# Patient Record
Sex: FEMALE | Race: BLACK OR AFRICAN AMERICAN | Employment: FULL TIME | URBAN - METROPOLITAN AREA
[De-identification: names, ages, dates, MRNs, and addresses within clinical notes are randomized per-mention and may not be internally consistent; named-entity substitution may affect disease eponyms.]

---

## 2020-02-10 ENCOUNTER — HOSPITAL ENCOUNTER (EMERGENCY)
Facility: HOSPITAL | Age: 25
Discharge: HOME | End: 2020-02-11
Attending: EMERGENCY MEDICINE
Payer: COMMERCIAL

## 2020-02-10 DIAGNOSIS — R11.2 NAUSEA VOMITING AND DIARRHEA: ICD-10-CM

## 2020-02-10 DIAGNOSIS — B96.89 BV (BACTERIAL VAGINOSIS): ICD-10-CM

## 2020-02-10 DIAGNOSIS — R19.7 NAUSEA VOMITING AND DIARRHEA: ICD-10-CM

## 2020-02-10 DIAGNOSIS — N30.90 BLADDER INFECTION: Primary | ICD-10-CM

## 2020-02-10 DIAGNOSIS — N76.0 BV (BACTERIAL VAGINOSIS): ICD-10-CM

## 2020-02-10 LAB
ALBUMIN SERPL-MCNC: 3.7 G/DL (ref 3.4–5)
ALBUMIN/GLOB SERPL: 0.9 {RATIO} (ref 1–2)
ALP LIVER SERPL-CCNC: 66 U/L (ref 52–144)
ALT SERPL-CCNC: 15 U/L (ref 13–56)
ANION GAP SERPL CALC-SCNC: 4 MMOL/L (ref 0–18)
AST SERPL-CCNC: 17 U/L (ref 15–37)
BASOPHILS # BLD AUTO: 0.01 X10(3) UL (ref 0–0.2)
BASOPHILS NFR BLD AUTO: 0.1 %
BILIRUB SERPL-MCNC: 0.7 MG/DL (ref 0.1–2)
BILIRUB UR QL STRIP.AUTO: NEGATIVE
BUN BLD-MCNC: 6 MG/DL (ref 7–18)
BUN/CREAT SERPL: 8.1 (ref 10–20)
CALCIUM BLD-MCNC: 8.6 MG/DL (ref 8.5–10.1)
CHLORIDE SERPL-SCNC: 107 MMOL/L (ref 98–112)
CLARITY UR REFRACT.AUTO: CLEAR
CO2 SERPL-SCNC: 26 MMOL/L (ref 21–32)
COLOR UR AUTO: YELLOW
CREAT BLD-MCNC: 0.74 MG/DL (ref 0.55–1.02)
DEPRECATED RDW RBC AUTO: 39.8 FL (ref 35.1–46.3)
EOSINOPHIL # BLD AUTO: 0.06 X10(3) UL (ref 0–0.7)
EOSINOPHIL NFR BLD AUTO: 0.5 %
ERYTHROCYTE [DISTWIDTH] IN BLOOD BY AUTOMATED COUNT: 12.2 % (ref 11–15)
GLOBULIN PLAS-MCNC: 4.1 G/DL (ref 2.8–4.4)
GLUCOSE BLD-MCNC: 87 MG/DL (ref 70–99)
GLUCOSE UR STRIP.AUTO-MCNC: NEGATIVE MG/DL
HCT VFR BLD AUTO: 43.4 % (ref 35–48)
HGB BLD-MCNC: 14.5 G/DL (ref 12–16)
IMM GRANULOCYTES # BLD AUTO: 0.03 X10(3) UL (ref 0–1)
IMM GRANULOCYTES NFR BLD: 0.2 %
KETONES UR STRIP.AUTO-MCNC: NEGATIVE MG/DL
LIPASE SERPL-CCNC: 36 U/L (ref 73–393)
LYMPHOCYTES # BLD AUTO: 1.05 X10(3) UL (ref 1–4)
LYMPHOCYTES NFR BLD AUTO: 8.6 %
M PROTEIN MFR SERPL ELPH: 7.8 G/DL (ref 6.4–8.2)
MCH RBC QN AUTO: 29.7 PG (ref 26–34)
MCHC RBC AUTO-ENTMCNC: 33.4 G/DL (ref 31–37)
MCV RBC AUTO: 88.8 FL (ref 80–100)
MONOCYTES # BLD AUTO: 0.59 X10(3) UL (ref 0.1–1)
MONOCYTES NFR BLD AUTO: 4.8 %
NEUTROPHILS # BLD AUTO: 10.52 X10 (3) UL (ref 1.5–7.7)
NEUTROPHILS # BLD AUTO: 10.52 X10(3) UL (ref 1.5–7.7)
NEUTROPHILS NFR BLD AUTO: 85.8 %
NITRITE UR QL STRIP.AUTO: POSITIVE
OSMOLALITY SERPL CALC.SUM OF ELEC: 281 MOSM/KG (ref 275–295)
PH UR STRIP.AUTO: 6 [PH] (ref 4.5–8)
PLATELET # BLD AUTO: 290 10(3)UL (ref 150–450)
POTASSIUM SERPL-SCNC: 3.7 MMOL/L (ref 3.5–5.1)
PROT UR STRIP.AUTO-MCNC: NEGATIVE MG/DL
RBC # BLD AUTO: 4.89 X10(6)UL (ref 3.8–5.3)
SODIUM SERPL-SCNC: 137 MMOL/L (ref 136–145)
SP GR UR STRIP.AUTO: 1.02 (ref 1–1.03)
UROBILINOGEN UR STRIP.AUTO-MCNC: <2 MG/DL
WBC # BLD AUTO: 12.3 X10(3) UL (ref 4–11)

## 2020-02-10 PROCEDURE — 81025 URINE PREGNANCY TEST: CPT

## 2020-02-10 PROCEDURE — 96374 THER/PROPH/DIAG INJ IV PUSH: CPT

## 2020-02-10 PROCEDURE — 83690 ASSAY OF LIPASE: CPT | Performed by: EMERGENCY MEDICINE

## 2020-02-10 PROCEDURE — 99284 EMERGENCY DEPT VISIT MOD MDM: CPT

## 2020-02-10 PROCEDURE — 81001 URINALYSIS AUTO W/SCOPE: CPT | Performed by: EMERGENCY MEDICINE

## 2020-02-10 PROCEDURE — 85025 COMPLETE CBC W/AUTO DIFF WBC: CPT | Performed by: EMERGENCY MEDICINE

## 2020-02-10 PROCEDURE — 96361 HYDRATE IV INFUSION ADD-ON: CPT

## 2020-02-10 PROCEDURE — 99285 EMERGENCY DEPT VISIT HI MDM: CPT

## 2020-02-10 PROCEDURE — 80053 COMPREHEN METABOLIC PANEL: CPT | Performed by: EMERGENCY MEDICINE

## 2020-02-10 RX ORDER — ONDANSETRON 2 MG/ML
4 INJECTION INTRAMUSCULAR; INTRAVENOUS ONCE
Status: COMPLETED | OUTPATIENT
Start: 2020-02-10 | End: 2020-02-10

## 2020-02-10 RX ORDER — HEPARIN SODIUM AND DEXTROSE 10000; 5 [USP'U]/100ML; G/100ML
INJECTION INTRAVENOUS CONTINUOUS
Status: CANCELLED | OUTPATIENT
Start: 2020-02-11

## 2020-02-10 RX ORDER — HEPARIN SODIUM AND DEXTROSE 10000; 5 [USP'U]/100ML; G/100ML
12 INJECTION INTRAVENOUS ONCE
Status: DISCONTINUED | OUTPATIENT
Start: 2020-02-10 | End: 2020-02-10

## 2020-02-10 RX ORDER — HEPARIN SODIUM 5000 [USP'U]/ML
60 INJECTION INTRAVENOUS; SUBCUTANEOUS ONCE
Status: DISCONTINUED | OUTPATIENT
Start: 2020-02-10 | End: 2020-02-10

## 2020-02-11 VITALS
RESPIRATION RATE: 17 BRPM | SYSTOLIC BLOOD PRESSURE: 108 MMHG | TEMPERATURE: 99 F | OXYGEN SATURATION: 100 % | WEIGHT: 160 LBS | HEART RATE: 68 BPM | DIASTOLIC BLOOD PRESSURE: 62 MMHG | BODY MASS INDEX: 33.58 KG/M2 | HEIGHT: 58 IN

## 2020-02-11 LAB — POCT URINE PREGNANCY: NEGATIVE

## 2020-02-11 PROCEDURE — 87491 CHLMYD TRACH DNA AMP PROBE: CPT | Performed by: EMERGENCY MEDICINE

## 2020-02-11 PROCEDURE — 87660 TRICHOMONAS VAGIN DIR PROBE: CPT | Performed by: EMERGENCY MEDICINE

## 2020-02-11 PROCEDURE — 87591 N.GONORRHOEAE DNA AMP PROB: CPT | Performed by: EMERGENCY MEDICINE

## 2020-02-11 PROCEDURE — 87480 CANDIDA DNA DIR PROBE: CPT | Performed by: EMERGENCY MEDICINE

## 2020-02-11 PROCEDURE — 87510 GARDNER VAG DNA DIR PROBE: CPT | Performed by: EMERGENCY MEDICINE

## 2020-02-11 RX ORDER — ONDANSETRON 4 MG/1
4 TABLET, ORALLY DISINTEGRATING ORAL EVERY 4 HOURS PRN
Qty: 10 TABLET | Refills: 0 | Status: SHIPPED | OUTPATIENT
Start: 2020-02-11 | End: 2020-02-18

## 2020-02-11 RX ORDER — METRONIDAZOLE 500 MG/1
500 TABLET ORAL 2 TIMES DAILY
Qty: 14 TABLET | Refills: 0 | Status: SHIPPED | OUTPATIENT
Start: 2020-02-11 | End: 2020-02-18

## 2020-02-11 RX ORDER — CIPROFLOXACIN 500 MG/1
500 TABLET, FILM COATED ORAL 2 TIMES DAILY
Qty: 6 TABLET | Refills: 0 | Status: SHIPPED | OUTPATIENT
Start: 2020-02-11 | End: 2020-02-14

## 2020-02-11 RX ORDER — ONDANSETRON 4 MG/1
4 TABLET, ORALLY DISINTEGRATING ORAL EVERY 4 HOURS PRN
Qty: 10 TABLET | Refills: 0 | Status: SHIPPED | OUTPATIENT
Start: 2020-02-11 | End: 2020-02-11 | Stop reason: CLARIF

## 2020-02-11 NOTE — ED PROVIDER NOTES
Patient Seen in: [facility] Emergency Department      History   Patient presents with:  Nausea/Vomiting/Diarrhea    Stated Complaint: vomiting since 11am    HPI    82-year-old female presents with nausea, vomiting, diarrhea.   Symptoms began 11 hours Cultures taken. No cervical motion tenderness. No adnexal tenderness on bimanual examination.   Extremities: no edema, normal peripheral pulses   Neuro: Alert oriented and nonfocal   Skin: no rashes or nodules    ED Course     Labs Reviewed   COMP METABOL consistent with gastroenteritis. Patient is well-appearing. Urinalysis is consistent with UTI. Prescribed Cipro. She has had no vomiting or diarrhea here in the emergency room. Pelvic cultures sent for evaluation. Await results.   Labs are otherwise

## 2020-02-12 LAB
C TRACH DNA SPEC QL NAA+PROBE: POSITIVE
N GONORRHOEA DNA SPEC QL NAA+PROBE: NEGATIVE

## 2020-02-13 RX ORDER — AZITHROMYCIN 500 MG/1
1000 TABLET, FILM COATED ORAL DAILY
Qty: 2 TABLET | Refills: 0 | Status: SHIPPED | OUTPATIENT
Start: 2020-02-13

## 2023-10-23 ENCOUNTER — NURSE ONLY (OUTPATIENT)
Dept: INTERNAL MEDICINE CLINIC | Facility: HOSPITAL | Age: 28
End: 2023-10-23
Attending: EMERGENCY MEDICINE

## 2023-10-23 DIAGNOSIS — Z00.00 WELLNESS EXAMINATION: Primary | ICD-10-CM

## 2023-10-23 PROCEDURE — 86480 TB TEST CELL IMMUN MEASURE: CPT

## 2023-10-24 LAB
M TB IFN-G CD4+ T-CELLS BLD-ACNC: 0.03 IU/ML
M TB TUBERC IFN-G BLD QL: NEGATIVE
M TB TUBERC IGNF/MITOGEN IGNF CONTROL: >10 IU/ML
QFT TB1 AG MINUS NIL: 0.04 IU/ML
QFT TB2 AG MINUS NIL: 0.04 IU/ML

## 2023-11-27 ENCOUNTER — TELEPHONE (OUTPATIENT)
Dept: INTERNAL MEDICINE CLINIC | Facility: HOSPITAL | Age: 28
End: 2023-11-27

## 2023-11-27 DIAGNOSIS — Z20.822 SUSPECTED COVID-19 VIRUS INFECTION: Primary | ICD-10-CM

## 2023-11-27 NOTE — TELEPHONE ENCOUNTER
[x] [facility]  []MIRNA   [] [address] [facility]  Manager : Eliz Chinchilla    HAVE YOU RECEIVED THE COVID-19 Vaccine? Yes []    No [x]          If yes, date(s) received: Which vaccine:  Pfizer []     Pedro Renae []    J&J []      SYMPTOMS (reported via dashboard):  [] asymptomatic  [x] symptomatic  [] GI symptoms only    Symptom onset date: 11/27  Fever   > 100F             Yes [x]      Cough                          Yes []      Shortness of breath  Yes []      Congestion                 Yes []      Runny nose                Yes []        Loss of Smell              Yes []        Loss of Taste             Yes []       Sore throat                 Yes []       Fatigue                        Yes []       Body Aches                Yes []        Chills                           Yes []        Headache                   Yes []             GI symptoms             Yes []     No [x]                     Nausea   []          Vomiting            []                                    Diarrhea  []          Upset stomach []      Employee has positive COVID Exposure? Yes []     No [x]    Date of exposure:   []  Coworker                       [] patient                        [] Family/friend    Employee has a history of Covid?   Yes []     No [x]   If Yes, when:    When was the last shift you worked?: 11/24      PLAN:     [id number] testing ordered: [x] Rapid    [] Alinity              Date test is to be taken:   11/27    []  Outside testing                           Notes:    INSTRUCTIONS PROVIDED:    [x]  Employee was instructed to call Central scheduling at [phone] or use [facility] to make an appointment for their testing   [x]  May return to work if employee views negative result in [address] and remains fever, vomiting, and diarrhea free  []  May continue to work if remains asymptomatic and views negative result in [address]  []  Follow up for condition update when resulting  []  If symptoms develop, stay home and call hotline for rapid test order  [] If COVID positive results, off work minimum of 5 days from positive test or onset of symptoms (day 0)     [x]  Plan noted above  [x]  Length of time to obtain results  []  Quarantine instructions  []  S/S of worsening infection/condition and importance of prompt medical re-evaluation including when to seek emergency care.    [] The employee voiced understanding

## 2023-11-28 ENCOUNTER — LAB ENCOUNTER (OUTPATIENT)
Dept: LAB | Facility: HOSPITAL | Age: 28
End: 2023-11-28
Attending: PREVENTIVE MEDICINE
Payer: COMMERCIAL

## 2023-11-28 DIAGNOSIS — Z20.822 SUSPECTED COVID-19 VIRUS INFECTION: ICD-10-CM

## 2023-11-28 LAB — SARS-COV-2 RNA RESP QL NAA+PROBE: NOT DETECTED

## 2023-11-28 NOTE — TELEPHONE ENCOUNTER
Results and RTW guidelines:    COVID RESULT:    [x] Viewed by employee in [address] RTW plan and instructions as indicated on triage call. Manager notified. Estimated RTW date: 11/27/23  [] Discussed with employee   [] Unable to reach by phone.   Sent via Echo it message      Test type:    [x] Rapid         [] Alinity         [] Outside test:       [x] NEGATIVE     Ordered Alinity retest?  []Yes   [x] No (skip to RTW)   Ordered Rapid retest?   []Yes   [x] No (skip to RTW)           Dated to be taken:      If Yes, PLACE ORDER NOW and instruct the following:  -Originally Symptomatic or Now Symptoms:   -RTW when sx improve- fever free for 24 hours w/o medications, Diarrhea/Vomiting for 24 hours w/o medications    -Originally  Asymptomatic  -Asymptomatic AND Vaccinated or Unvaccinated or Prior infection in past 90 days:   -May work and continue to monitor symptoms for the next 10 days.                                         -Alinity test day 2, Alinity test day 5 (day 0 - exposure)

## (undated) NOTE — ED AVS SNAPSHOT
Darlin Linh   MRN: [id number]    Department:  [facility] Emergency Department   Date of Visit:  2/10/2020           Disclosure     Insurance plans vary and the physician(s) referred by the ER may not be covered by your plan.  Please contact yo tell this physician (or your personal doctor if your instructions are to return to your personal doctor) about any new or lasting problems. The primary care or specialist physician will see patients referred from the [facility] Emergency Department.  Cynthia Easley

## (undated) NOTE — LETTER
Date & Time: 2/11/2020, 12:34 AM  Patient: Elena Rivera  Encounter Provider(s):    Nathan Mora MD       To Whom It May Concern:    Hannah Pulliam was seen and treated in our department on 2/10/2020. She can return to work.     If you have any ques